# Patient Record
(demographics unavailable — no encounter records)

---

## 2025-02-05 NOTE — DISCUSSION/SUMMARY
[Medication Risks Reviewed] : Medication risks reviewed [de-identified] : Assessment: - Summary : The patient's dissatisfaction with activities of daily living and the severity of the symptoms point to severe stenosis. Symptoms and X-ray findings are indicative of an L4-L5 spondylolisthesis. - Problems : - Chronic back pain  - Numbness and tingling in the legs  - Stress fracture in the back  - Significant spondylolisthesis  - Possible severe stenosis  - Differential Diagnosis : - Spinal stenosis  - Spondylolisthesis  - Sciatica  Plan: - Summary : To ensure a more definitive diagnosis, an MRI scan is ordered. Medication (Gabapentin) will be prescribed to manage nerve pain. After examining the MRI results, further options including injections or possibly even surgery can be discussed. - Plan : - Order an MRI scan lumbar spine without contrast  - Prescribe Gabapentin medication 100 mg tablets prescribed  - Follow-up consultation after MRI results.  We discussed the option of more definitive treatment of his condition with laminectomy and fusion at the L4-5 level versus epidural steroid injections bilaterally at  The patient was educated regarding their condition, treatment options as well as prescribed course of treatment.  Risks and benefits as well as alternatives to the proposed treatment were also provided to the patient  They were given the opportunity to have all their questions answered to their satisfaction.  Vital signs were reviewed with the patient and the patient was instructed to followup with their primary care provider for further management. There were no PAs or scribes used in the evaluation, exam or treatment plan discussion. The surgeon was the primary evaluating or treating physician as noted above.

## 2025-02-05 NOTE — HISTORY OF PRESENT ILLNESS
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 The severity of the pain has increased over the past year to the point where he can't walk a block. He experiences numbness and tingling in both legs after walking for 2 minutes, with worse symptoms on the right side. Mr. Burger has been treated by his primary care doctor thus far. He has undergone physical therapy multiple times, which gives temporary relief. He has also been on anti-inflammatory and muscle relaxant medication. No surgeries have been done before. - Chief Complaint (CC) : 10-year-long history of back pain that has been worsening over the past year, affecting mobility and causing severe discomfort. - History of Present Illness (HPI) : He has been experiencing sciatic pain, particularly on his right side. Discomfort increases with walking and standing, and eases with rest. He has been treated primarily with physical therapy and medications up until now, but the pain persists and has even been worsening. - Past Medical History : History of work that involved heavy lifting. No known history of surgeries. - Past Surgical History : None reported. - Family History : Not provided. 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 He has since retired, approximately more than 5 years ago. - Review of Systems : Positive for musculoskeletal and neurological complaints. - Medications : He used to take anti-inflammatory and muscle relaxant medication.

## 2025-02-05 NOTE — PHYSICAL EXAM
[Stooped] : stooped [] : Motor: [___/5] : left ([unfilled]/5) [LE] : Sensory: Intact in bilateral lower extremities [0] : left ankle jerk 0 [SLR] : negative straight leg raise [Plantar Reflex Right Only] : absent on the right [Plantar Reflex Left Only] : absent on the left [DTR Reflexes Clonus Of Right Ankle (___ Beats)] : absent on the right [DTR Reflexes Clonus Of Left Ankle (___ Beats)] : absent on the left [de-identified] : seen with his son and daughter-in-law [de-identified] : flex to ankles with back pain, ext 30 degrees with back pain and leg pain Paraspinal lumbar tenderness [de-identified] : 4 views lumbar spine obtained today demonstrate minimal left-sided lumbar curve.  On the lateral projection hyperlordosis lumbar spine with pars defect at L4.  Grade 2 spondylolisthesis at L4-5 with loss of disc height and some endplate sclerosis.  No obvious dynamic instability between flexion-extension.  Remaining disc appear well-preserved  AP pelvis demonstrates vascular clips at the level of the pubic symphysis suggestive of possible prior prostate surgery.  Hips are well-preserved.  No acute fractures.  No significant degeneration

## 2025-03-13 NOTE — ASSESSMENT
[FreeTextEntry1] : Balanitis: Lotrisone twice daily x 2 weeks. Labs reviewed. Follow up in one year.

## 2025-03-13 NOTE — HISTORY OF PRESENT ILLNESS
[FreeTextEntry1] : H/o Prostate cancer, [place] 8. s/p EBRT, 2 years of ADT. Radiation completed 2/1/2017 EBRT Dose: 8100 cGy. EBRT Fractions: 45 fractions. Presents today with complaints of penile redness and ulceration which was initially noted 4 weeks ago. He consulted his PCP and was treated with doxycycline Hyclate BID X 10 day, Diflucan, nystan/triamcinolone.  He thinks that ulceration has improved but not complete resolved. Denies fever or chills.  Denies hx of diabetes.  Denies bothersome urinary symptoms.

## 2025-03-13 NOTE — HISTORY OF PRESENT ILLNESS
[FreeTextEntry1] : H/o Prostate cancer, [place] 8. s/p EBRT, 2 years of ADT. Radiation completed 2/1/2017 EBRT Dose: 8100 cGy. EBRT Fractions: 45 fractions. Presents today with complaints of penile redness and ulceration which was initially noted 4 weeks ago. He consulted his PCP and was treated with doxycycline Hyclate BID X 10 day, Diflucan, nystan/triamcinolone.  He thinks that ulceration has improved but not complete resolved. Denies fever or chills.  Denies hx of diabetes.  Denies bothersome urinary symptoms.

## 2025-04-28 NOTE — HISTORY OF PRESENT ILLNESS
[de-identified] : - Summary : Patient is a male with a 10-year history of back pain, last seen on February 5, 2025. He reports no significant changes since the last visit but mentions worsening symptoms, including difficulty walking more than one block. - Chief Complaint (CC) : Chronic low back pain with worsening mobility - History of Present Illness (HPI) : Patient has been experiencing back pain for 10 years. He was last seen three months ago on February 5, 2025. Since then, his condition has not improved and may have worsened. He now reports difficulty walking more than one block. The patient has previously tried physical therapy, which provided temporary relief but symptoms returned quickly after completion. - Past Medical History : 10-year history of chronic low back pain  - Review of Systems : Musculoskeletal Positive for back pain and difficulty walking

## 2025-04-28 NOTE — PHYSICAL EXAM
[Stooped] : stooped [] : Motor: [___/5] : left ([unfilled]/5) [LE] : Sensory: Intact in bilateral lower extremities [0] : left ankle jerk 0 [SLR] : negative straight leg raise [Plantar Reflex Right Only] : absent on the right [Plantar Reflex Left Only] : absent on the left [DTR Reflexes Clonus Of Right Ankle (___ Beats)] : absent on the right [DTR Reflexes Clonus Of Left Ankle (___ Beats)] : absent on the left [de-identified] : seen with his son and daughter-in-law [de-identified] : flex to ankles with back pain, ext 30 degrees with back pain and leg pain Paraspinal lumbar tenderness [de-identified] : EXAM: [id number] - MR SPINE LUMBAR - ORDERED BY: DYAN PAULA  PROCEDURE DATE: 02/15/2025  INTERPRETATION: CLINICAL INFORMATION: Leg pain  MRI of the lumbar spine was from sagittal T1 and T2 and STIR sequences. Axial T2 and coronal T1-weighted sequences were performed.  Loss of the normal lumbar lordosis is seen  Grade 1 anterolisthesis is again seen involving L3 on L4. Bilateral lysis defects are suspected. This can be confirmed with plain film or CT scan. Disc desiccation is seen involving the L4-5 level which secondary chronic degenerative change  Mild disc desiccation is again seen involving the L1-2, L2-3, L3-4 and L5-S1 levels.  T12-L1: Normal  L1-2: Normal  L2-3: Disc bulge is identified more prominent on the left side. This is slightly more prominent when compared with the prior exam. There is associated T2 prolongation seen which is compatible with an annular fissure.. Bilateral hypertrophic facet joint changes. Mild narrowing of the spinal canal. Moderate narrowing of both neural foramen is seen. The narrowing of the spinal canal is slightly increased when compared with the prior exam.  L3-4: Bilateral hypertrophic facet joint changes are seen. No significant compromise of the spinal canal or either neural foramen  L4-5: Disc bulge and bilateral hypertrophic facet joint change is seen. Moderate to severe narrowing of the spinal canal is seen and slightly increased when compared with the prior exam. Mild narrowing of both neural foramen is again seen.  L5-S1: Disc bulge and bilateral hypertrophic facet joint change. Moderate to severe narrowing of both neural foramen  The conus ends at the bottom of L1 and appears normal  Evaluation the paraspinal soft tissues appear normal.  Both SI joints appear intact.  Bilateral renal cysts are identified.  Impression: Grade 1 anterolisthesis involving L4 on L5 is again seen with suspected bilateral lysis defects.  Degenerative changes are again seen some which has increased as described above.  --- End of Report ---  MANUEL BARRY MD; Attending Radiologist This document has been electronically signed. Feb 18 2025 6:51PM

## 2025-04-28 NOTE — DISCUSSION/SUMMARY
[Medication Risks Reviewed] : Medication risks reviewed [de-identified] : Assessment: - Summary : The patient has severe spinal stenosis and spondylolisthesis at L4-5 level (transitional vertebra). The condition is causing significant functional impairment, with the patient now only able to walk one block. The MRI shows bilateral pars defects, grade 1 anterolisthesis, and moderate to severe spinal canal narrowing. This is a progressive condition that is unlikely to improve without surgical intervention. - Problems : - Spinal stenosis at L4-5  - Spondylolisthesis at L4-5  - Chronic low back pain  - Functional impairment (limited walking ability)  - Differential Diagnosis : - Degenerative disc disease  - Facet joint arthropathy  - Lumbar radiculopathy  Plan: - Summary : Given the severity of the patient's condition and the failure of conservative treatments, I recommend surgical intervention. The proposed surgery involves decompression of the affected area and stabilization with screws and rods to realign the spine and prevent further progression of the spondylolisthesis. - Plan : - Recommend spinal fusion surgery at L4-5 level, transitional, to follow the radiologist's numbering system.  TLIF with laminectomy and spinal instrumentation is recommended.  - Discuss surgical risks and benefits with the patient and family  - Schedule pre-operative evaluation  - Plan for overnight hospital stay post-surgery  - Arrange post-operative care including home exercise program and physical therapy  - Advise on post-operative restrictions: no driving or lifting for 6 weeks  - Schedule follow-up appointment in 2-3 weeks post-surgery  - Consider pain management options in the interim if surgery is delayed, he may benefit from bilateral L4 transforaminal epidural steroid injection to manage his symptoms prior to any surgical intervention if surgery is delayed.  He is not interested in surgery

## 2025-04-28 NOTE — REASON FOR VISIT
[Follow-Up Visit] : a follow-up visit for [Spondylolistheses] : spondylolistheses [FreeTextEntry2] : intervertebral degeneration of disc in lumbosacral region with discogenic back pain/ lumbar radiculitis/

## 2025-07-15 NOTE — ASSESSMENT
[FreeTextEntry1] : Outside lab reviewed and scanned in chart. PSA remains stable. Follow up in one year.

## 2025-07-15 NOTE — HISTORY OF PRESENT ILLNESS
[FreeTextEntry1] : H/o Prostate cancer, [place] 8. s/p EBRT, 2 years of ADT. Radiation completed 2/1/2017 EBRT Dose: 8100 cGy. EBRT Fractions: 45 fractions. Presents today for annual follow up visit. Accompanied by son who translate.  05/03/25 PSA 0.02 ng/mL  Denies bothersome urinary symptoms. Nocturia x2.  Has known left inguinal hernia as well as umbilical abdominal hernia. Bothersome occasional discomfort in the left groin. He will follow up with Dr Dirk Carmichael to discuss surgery.   Remainder of his medical hx remains stable since last visit.